# Patient Record
Sex: MALE | Race: WHITE | Employment: STUDENT | URBAN - METROPOLITAN AREA
[De-identification: names, ages, dates, MRNs, and addresses within clinical notes are randomized per-mention and may not be internally consistent; named-entity substitution may affect disease eponyms.]

---
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
## 2019-02-21 ENCOUNTER — OFFICE VISIT (OUTPATIENT)
Dept: FAMILY MEDICINE CLINIC | Age: 19
End: 2019-02-21
Payer: COMMERCIAL

## 2019-02-21 VITALS
WEIGHT: 167 LBS | HEART RATE: 77 BPM | DIASTOLIC BLOOD PRESSURE: 82 MMHG | OXYGEN SATURATION: 98 % | SYSTOLIC BLOOD PRESSURE: 104 MMHG | TEMPERATURE: 97.7 F | BODY MASS INDEX: 22.65 KG/M2

## 2019-02-21 DIAGNOSIS — J11.1 INFLUENZA: Primary | ICD-10-CM

## 2019-02-21 PROCEDURE — 99213 OFFICE O/P EST LOW 20 MIN: CPT | Performed by: NURSE PRACTITIONER

## 2019-02-21 ASSESSMENT — PATIENT HEALTH QUESTIONNAIRE - PHQ9
2. FEELING DOWN, DEPRESSED OR HOPELESS: 0
SUM OF ALL RESPONSES TO PHQ QUESTIONS 1-9: 0
SUM OF ALL RESPONSES TO PHQ QUESTIONS 1-9: 0
SUM OF ALL RESPONSES TO PHQ9 QUESTIONS 1 & 2: 0
1. LITTLE INTEREST OR PLEASURE IN DOING THINGS: 0

## 2019-04-14 ENCOUNTER — HOSPITAL ENCOUNTER (EMERGENCY)
Age: 19
Discharge: HOME | End: 2019-04-14
Payer: COMMERCIAL

## 2019-04-14 ENCOUNTER — APPOINTMENT (OUTPATIENT)
Dept: GENERAL RADIOLOGY | Age: 19
End: 2019-04-14
Payer: COMMERCIAL

## 2019-04-14 VITALS
DIASTOLIC BLOOD PRESSURE: 78 MMHG | BODY MASS INDEX: 22.43 KG/M2 | RESPIRATION RATE: 16 BRPM | SYSTOLIC BLOOD PRESSURE: 136 MMHG | WEIGHT: 170 LBS | OXYGEN SATURATION: 99 % | HEART RATE: 78 BPM | TEMPERATURE: 98.3 F

## 2019-04-14 VITALS
SYSTOLIC BLOOD PRESSURE: 136 MMHG | RESPIRATION RATE: 18 BRPM | OXYGEN SATURATION: 98 % | HEIGHT: 73 IN | DIASTOLIC BLOOD PRESSURE: 75 MMHG | HEART RATE: 88 BPM | TEMPERATURE: 98 F | BODY MASS INDEX: 22.53 KG/M2 | WEIGHT: 170 LBS

## 2019-04-14 DIAGNOSIS — M25.562 LEFT KNEE PAIN, UNSPECIFIED CHRONICITY: Primary | ICD-10-CM

## 2019-04-14 DIAGNOSIS — M25.562 ACUTE PAIN OF LEFT KNEE: Primary | ICD-10-CM

## 2019-04-14 PROCEDURE — 6370000000 HC RX 637 (ALT 250 FOR IP): Performed by: PHYSICIAN ASSISTANT

## 2019-04-14 PROCEDURE — 99283 EMERGENCY DEPT VISIT LOW MDM: CPT

## 2019-04-14 PROCEDURE — 73560 X-RAY EXAM OF KNEE 1 OR 2: CPT

## 2019-04-14 RX ORDER — IBUPROFEN 800 MG/1
800 TABLET ORAL EVERY 6 HOURS PRN
Qty: 30 TABLET | Refills: 0 | Status: SHIPPED | OUTPATIENT
Start: 2019-04-14

## 2019-04-14 RX ORDER — HYDROCODONE BITARTRATE AND ACETAMINOPHEN 5; 325 MG/1; MG/1
1 TABLET ORAL EVERY 4 HOURS PRN
Qty: 12 TABLET | Refills: 0 | Status: SHIPPED | OUTPATIENT
Start: 2019-04-14 | End: 2019-04-17

## 2019-04-14 RX ORDER — DIAZEPAM 5 MG/1
5 TABLET ORAL ONCE
Status: COMPLETED | OUTPATIENT
Start: 2019-04-14 | End: 2019-04-14

## 2019-04-14 RX ORDER — HYDROCODONE BITARTRATE AND ACETAMINOPHEN 5; 325 MG/1; MG/1
1 TABLET ORAL ONCE
Status: COMPLETED | OUTPATIENT
Start: 2019-04-14 | End: 2019-04-14

## 2019-04-14 RX ADMIN — HYDROCODONE BITARTRATE AND ACETAMINOPHEN 1 TABLET: 5; 325 TABLET ORAL at 13:11

## 2019-04-14 RX ADMIN — DIAZEPAM 5 MG: 5 TABLET ORAL at 21:20

## 2019-04-14 ASSESSMENT — PAIN DESCRIPTION - PAIN TYPE: TYPE: ACUTE PAIN

## 2019-04-14 ASSESSMENT — PAIN SCALES - GENERAL
PAINLEVEL_OUTOF10: 7
PAINLEVEL_OUTOF10: 8
PAINLEVEL_OUTOF10: 8

## 2019-04-14 ASSESSMENT — PAIN DESCRIPTION - LOCATION: LOCATION: KNEE

## 2019-04-14 ASSESSMENT — PAIN DESCRIPTION - DESCRIPTORS: DESCRIPTORS: CONSTANT

## 2019-04-14 ASSESSMENT — PAIN DESCRIPTION - ORIENTATION: ORIENTATION: LEFT

## 2019-04-14 NOTE — LETTER
[facility] Emergency Department  [address] [address]  Phone: [phone]  Fax: [phone]             April 14, 2019    Patient: Laura March   YOB: 2000   Date of Visit: 4/14/2019       To Whom It May Concern:    Luara March was seen and treated in our emergency department on 4/14/2019.  He needs to have access to a wheelchair for use until seen and cleared by an Ortho Specialist.       Sincerely,     Nurse,        Signature:__________________________________

## 2019-04-14 NOTE — ED TRIAGE NOTES
Pt states he was playing soccer, when his L knee just gave out on him. Pt states he is in a lot of pain, pt states he cant apply any pressure to his leg .

## 2019-04-14 NOTE — ED PROVIDER NOTES
eMERGENCY dEPARTMENT eNCOUnter      PCP: Laly Cuba, APRN - CNP    CHIEF COMPLAINT    Chief Complaint   Patient presents with    Knee Pain     left knee; patient states that he injured it during soccer practice       HPI    Leslie Valdez is a 22 y.o. male who presents with Left knee pain. Onset was one week ago, then again today. The context is Pt plays soccer for [employer] and twisted knee in a valgus direction. Pain is localized to the medial knee. The pain severity is moderate-severe. The patient has no associated other injuries. The pain is aggravated by ambulation and knee movement. Georgian Justice REVIEW OF SYSTEMS    General: Denies fever or chills  Cardiac: Denies chest pain  Pulmonary: Denies shortness of breath  GI: Denies abdominal pain, vomiting, or diarrhea  : No dysuria or hematuria    Denies any other muscles skeletal injuries, including chest wall and back.     All other review of systems are negative  See HPI and nursing notes for additional information     PAST MEDICAL & SURGICAL HISTORY    Past Medical History:   Diagnosis Date    APML (acute promyelocytic leukemia) ([facility] [address] 04/09/2013    APML (acute promyelocytic leukemia) ([facility] [address]      Past Surgical History:   Procedure Laterality Date    BONE MARROW BIOPSY      from his lumbar vertebrae    BONE MARROW BIOPSY      CLAVICLE SURGERY  10/2018       CURRENT MEDICATIONS    Current Outpatient Rx   Medication Sig Dispense Refill    Phenylephrine-DM-GG-APAP (VICKS DAYQUIL SEVERE COLD/FLU) 5--325 MG/15ML LIQD Take as directed every 4 hours during the day  0    Phenyleph-Doxylamine-DM-APAP (VICKS NYQUIL SEVERE COLD/FLU) 5-6.25- MG/15ML LIQD Take at night time as directed  0    ibuprofen (ADVIL;MOTRIN) 600 MG tablet Take 1 tablet by mouth every 6 hours as needed for Pain 28 tablet 0       ALLERGIES    Allergies   Allergen Reactions    Phenergan [Promethazine] Itching and Other (See Comments)     Redness    Phenergan [Promethazine Hcl] extension (Extensor mechanism intact)    Flexion - Mildly limited due pain   -Provocative tests:   Manual manipulation of knee limited due to pain. No swelling, discoloration, tenderness to palpation of lower leg, or range of motion deficit of the ipsilateral hip and ankle  Vascular: Distal pulses (DP, PT) intact on affected side. Capillary refill intact. Integument:  Well hydrated, no rash   Neurologic:  Awake and alert, normal flow of speech. Distal sensation, motor intact. Psychiatric: Cooperative, pleasant affect        RADIOLOGY/PROCEDURES    Xr Knee Left (1-2 Views)    Result Date: 4/14/2019  EXAMINATION: 2 XRAY VIEWS OF THE LEFT KNEE 4/14/2019 12:12 pm COMPARISON: None. HISTORY: ORDERING SYSTEM PROVIDED HISTORY: Injury TECHNOLOGIST PROVIDED HISTORY: Reason for exam:->Injury Ordering Physician Provided Reason for Exam: injury, patient not able to straighten knee Acuity: Acute Type of Exam: Initial Mechanism of Injury: male who presents with Left knee pain. Onset was one week ago, then again today. The context is Pt plays soccer for [employer] and twisted knee in a valgus direction. Pain is localized to the medial knee. The pain severity is moderate-severe FINDINGS: No evidence of acute fracture or dislocation. No focal osseous lesion. No evidence of joint effusion. No focal soft tissue abnormality. No acute abnormality of the knee. ED COURSE & MEDICAL DECISION MAKING        History and exam is consistent with knee sprain versus strain. .  I discussed the possibility of internal derangement. Recommend follow-up with orthopedist..  Diagnosis and plan discussed in detail with patient who understands and agrees. Patient agrees to return emergency department if symptoms worsen or any new symptoms develop. Vital signs and nursing notes reviewed during ED course. I have independently evaluated this patient .   Supervising MD present in the Emergency Department, available for consultation, throughout entirety of  patient care. Clinical  IMPRESSION    1. Acute pain of left knee            Comment: Please note this report has been produced using speech recognition software and may contain errors related to that system including errors in grammar, punctuation, and spelling, as well as words and phrases that may be inappropriate. If there are any questions or concerns please feel free to contact the dictating provider for clarification.        Guadalupe Jaimes [place]  04/14/19 0148

## 2019-04-14 NOTE — ED NOTES
Discharge instructions reviewed with patient. PT verbalizes understanding. All questions answered. Follow up instructions given. PT denies any further needs at this time.       Caitlyn Escudero, [place]  44/36/52 [id number]

## 2019-04-15 NOTE — ED PROVIDER NOTES
eMERGENCY dEPARTMENT eNCOUnter        [facility]    Chief Complaint   Patient presents with    Knee Pain       HPI    Gerald Dailey is a 22 y.o. male who presents with left knee pain. Onset was initially about a week ago, new injury again this morning. Patient reports being a [employer]  and twisting the knee while playing. He was seen here this morning and had a negative XR--was advised that he may have a ligamentous tear and needs FU with Orthopedics. Patient was prescribed Norco.  He refused knee immobilizer at the time. He states he is back because the knee still hurts. Pain has been mostly constant. Localized to the medial aspect of the knee. He has taken the Norco with intermittent relief. He had a muscle spasm just prior to arrival which triggered call to EMS. Severity of the pain is 7/10. He denies any new injury since being seen this morning. REVIEW OF SYSTEMS    Constitutional:  Denies fever. Respiratory:  Denies any shortness of breath. Cardiovascular:  Denies chest pain. Musculoskeletal:  + left knee pain. Integument:  Denies erythema, denies abrasions/lacerations. Neurologic:  Denies numbness or tingling. PAST MEDICAL & SURGICAL HISTORY    Past Medical History:   Diagnosis Date    APML (acute promyelocytic leukemia) ([facility] [address] 04/09/2013    APML (acute promyelocytic leukemia) ([facility] [address]      Past Surgical History:   Procedure Laterality Date    BONE MARROW BIOPSY      from his lumbar vertebrae    BONE MARROW BIOPSY      CLAVICLE SURGERY  10/2018       CURRENT MEDICATIONS    Current Outpatient Rx   Medication Sig Dispense Refill    ibuprofen (ADVIL;MOTRIN) 800 MG tablet Take 1 tablet by mouth every 6 hours as needed for Pain 30 tablet 0    HYDROcodone-acetaminophen (NORCO) 5-325 MG per tablet Take 1 tablet by mouth every 4 hours as needed for Pain for up to 3 days.  12 tablet 0    Phenylephrine-DM-GG-APAP (VICKS DAYQUIL SEVERE COLD/FLU) 5--325 MG/15ML

## 2019-04-26 ENCOUNTER — HOSPITAL ENCOUNTER (OUTPATIENT)
Dept: PHYSICAL THERAPY | Age: 19
Setting detail: THERAPIES SERIES
Discharge: HOME | End: 2019-04-26
Payer: COMMERCIAL

## 2019-04-26 PROCEDURE — 97161 PT EVAL LOW COMPLEX 20 MIN: CPT

## 2019-04-26 PROCEDURE — G0283 ELEC STIM OTHER THAN WOUND: HCPCS

## 2019-04-26 NOTE — PROGRESS NOTES
[facility]) at [facility]   [address] [address]  Fax [phone]     Malik Porter PT,ATC Phone: [phone]                                              To:  Dr Livier Edouard          From: Alan Mccord, PT ATC     Patient: Roula Verdugo                    : 2000  Diagnosis:  L ACL BPTB + Med Men Repair 19    Date: 2019   Initial PT Evaluation/POC                   Evaluation Date:  19        Total Visits to date: 1       Outcome Measure: IKDC                     Initial Score: 13.8%   Discharge Score:             Sport:  [employer] (also plays tennis here at [place])  Patient Goal: Be ready for soccer season in 2020, return to soccer fall    Patient History/Mechanism of Injury: Pt states he was at practice 19 and left knee buckled. He saw Dr Angela Medrano and was told to stop playing soccer for a week, then return to see Dr. Solomon Simple states he doesn't remember the doctor saying that. Pt returned to soccer and tore his ACL on 19 during a captains practice. Surgery 19.     Symptoms:     location   description   duration   rate 0-10    Inf patella sharp constant 2-4/10   Aggrevators:  WB             Relievers: Ice+rest    Objective/Significant Findings + Goals     19 Goals TBA in 6wks   Outcome IKDC 13.8% >30% in 2wks   Brace Locked for 4 wks, at wk two drop lock to sit Drop lock before he leaves for summer break   Pain Constant 2-4/10 Int <3/10   Sleeping Sleeping well  MET   Gait 2 crutches locked brace Off crutches between weeks 4-6 per MD   Quad Tone Poor, no patellar glide >fair+ with sup patellar glide and TKE   PROM Flexion Very apprehensive 45deg 90 deg by week 4   PROM Ext 2-3 deg flexed 2-3deg hyper   Edema (knee circumference) NT NA   Quad Girth (4in sup to patella) L=46cm   R=46cm MET   SLR unable x30 with no lag     Goal Status: [] Achieved [] Partially Achieved  [x] Not Achieved, established today     Assessment:  Rehab physician

## 2019-04-26 NOTE — FLOWSHEET NOTE
[facility]) Physical Therapy at [address], [facility], [address]   Phone: [phone]   Fax:[phone]         Physical Therapy Treatment Note  Date:  2019    Patient Name:  Leslie Valdez      :  2000 MRN: [id number]    Physician: Lottie Cowden  Next Doctor Visit:  ? Insurance/Certification information: Does not have BMI  Plan of care signed (Y/N): Pending  Changes to ambulatory summary sheet? No    Visit# / total visits:  1      Summary of history from Evaluation:  Sport:  [employer] (also plays tennis here at [place])  Patient Goal: Be ready for soccer season in 2020, return to soccer fall    Patient History/Mechanism of Injury: Pt states he was at practice 19 and left knee buckled. He saw Dr Danilo Slater and was told to stop playing soccer for a week, then return to see Dr. Davis Terrell states he doesn't remember the doctor saying that. Pt returned to soccer and tore his ACL on 19 during a captains practice. Surgery 19.     Subjective/Pain:  Constant 2-4/10 inf patella    Objective: See [place], poor quad tone with min sup patellar glide    Exercises:  L ACL BPTB + Med Min Repair 19 Visit# Visit#      Visit#1     ES 10min     ROM          Ext 0     Flexion stretch with 12in bolster under knee   10 min     Flexion Flex=60 apprehensive, used bolster     Gait training TTWB locked brace 2 crutches     Exercises      SLR x30     Side Hip Abd x30     Heel slides with towel x10                       Manual Treatment  Patellar mobs     Ice 10min       Home Exercises Given: QS Flexion    Assessment:  Apprehensive and tight with flexion    Phase I - Immediate post-operative phase     Goals:   Educate the patient on precautions to protect the repair    Decrease pain and inflammation    Stimulate quad function     Attain ROM - especially patellar mobility and full extension by 2 weeks    Teach proper gait with crutches and brace    Improve neuromuscular control     Guidelines:   With posterior horn or unstable repair, NO hamstring work  for 6 weeks - no active knee flexion, heelslide or hip extension   Partial weight bearing for  4 weeks up to 50% with brace locked - 2 crutches   Criteria for FWB:  little to no effusion, good SLR with no quad lag & absent pain at repair site (MD will release patient for full WB between 4-6 weeks post-op typically)   If medial repair, avoid adduction SLR and if lateral repair avoid abduction SLR for 1st 1-2 weeks   PROM flexion not beyond 90° for 4 weeks - if not posterior horn may flex to 105°    Phase I:  Maximal Protection (weeks 1-4: days 1-28)    Brace locked at [facility]. At week 2, may drop lock brace for sitting only up to 90°- DO NOT ADJUST WITHOUT MD APPROVAL. PWB with 2 crutches. Goals:   PROM 0-90°, good patellar mobility   Quad control sufficient for SLR independently with no lag   Decrease pain and inflammation   Educate Patient on precautions to protect the repair      EXERCISES   Patellar mobilizations   Calf stretch with towel/strap   Seated hamstring stretch with towel roll under ankle to promote full extension   Focus on obtaining full extension. Overpressure/low load into full passive extension - calf prop or seated low load.  Ankle pumps   Quad sets - may use functional e stim to facilitate quad contraction   SLR (add 1# at a time after 1 week if no quad lag) - may use estim for quad contraction   SLR abduction or adduction, no extension if posterior horn or unstable repair   Multi angle quad sets (30, 60, 90°)   Seated PROM with opposite leg 0-90°   Week 2 AROM extension 90-30°, may add 1# weight at a time.    Week 3 initiate balance/proprioception with upper extremity support & bilateral lower extremities on the ground   Bilateral WB exercises: mini squats, heel raises, terminal knee extension with crutches and band above knee joint line    Treatment/Activity Tolerance:  [x] Patient tolerated treatment well [] Patient limited by fatigue  [x] Patient limited by pain  [] Patient limited by other medical complications  [] Other:     Time In/Time Out: 3803-6438                      Timed Code/Total Treatment Minutes: 1Eval Low   1ES    Plan: Cont per above protocol    Electronically signed by:

## 2019-05-03 ENCOUNTER — HOSPITAL ENCOUNTER (OUTPATIENT)
Dept: PHYSICAL THERAPY | Age: 19
Setting detail: THERAPIES SERIES
Discharge: HOME | End: 2019-05-03
Payer: COMMERCIAL

## 2019-05-03 PROCEDURE — G0283 ELEC STIM OTHER THAN WOUND: HCPCS

## 2019-05-03 PROCEDURE — 97110 THERAPEUTIC EXERCISES: CPT

## 2019-05-03 PROCEDURE — 97140 MANUAL THERAPY 1/> REGIONS: CPT

## 2019-05-03 NOTE — FLOWSHEET NOTE
[facility]) Physical Therapy at [address], [facility], [address]  Phone: [phone]   Fax:[phone]         Physical Therapy Treatment Note  Date:  5/3/2019    Patient Name:  Ja Buchanan      :  2000 MRN: [id number]    Physician: Remy Moyer Doctor Visit:  ? Insurance/Certification information: Does not have BMI  Plan of care signed (Y/N): Pending  Changes to ambulatory summary sheet? No    Visit# / total visits: 2    Summary of history from Evaluation:  Sport:  [employer] (also plays tennis here at [place])  Patient Goal: Be ready for soccer season in 2020, return to soccer fall    Patient History/Mechanism of Injury: Pt states he was at practice 19 and left knee buckled. He saw Dr Chester Escobar and was told to stop playing soccer for a week, then return to see Dr. Radha Vincent states he doesn't remember the doctor saying that. Pt returned to soccer and tore his ACL on 19 during a captains practice. Surgery 19. Subjective/Pain:  Constant 2-4/10 inf patella    Objective: See [place], poor quad tone with min sup patellar glide    Exercises:  L ACL BPTB + Med Min Repair 4-22-19    4-26-19 4-30-19 5-3-19      Visit#1 CANCELLED Visit#2   ES 10min  10min   ROM          Ext 0  0   Flexion stretch with 12in bolster under knee   10 min  Hang off table 10 min   Flexion Flex=60 apprehensive, used bolster  90 after stretching   Gait training TTWB locked brace 2 crutches  Reviewed gait TTWB 2 crutches   Exercises      SLR x30  x30   Side Hip Abd x30  x30   Heel slides with towel x10  x10            Had \"tube\" of swelling around thigh, given tubigrip. To see  Monday         Manual Treatment  Patellar mobs  I with mobs   Ice 10min  10 min     Home Exercises Given: QS Flexion Showed patient how to drop lock.   Will wait until MD appt to do that  Assessment:  After stretching, flexion to 80 with open end feel    Phase I - Immediate post-operative phase     Goals:   Educate the patient on precautions to protect the repair    Decrease pain and inflammation    Stimulate quad function     Attain ROM - especially patellar mobility and full extension by 2 weeks    Teach proper gait with crutches and brace    Improve neuromuscular control     Guidelines:   With posterior horn or unstable repair, NO hamstring work  for 6 weeks - no active knee flexion, heelslide or hip extension   Partial weight bearing for  4 weeks up to 50% with brace locked - 2 crutches   Criteria for FWB:  little to no effusion, good SLR with no quad lag & absent pain at repair site (MD will release patient for full WB between 4-6 weeks post-op typically)   If medial repair, avoid adduction SLR and if lateral repair avoid abduction SLR for 1st 1-2 weeks   PROM flexion not beyond 90° for 4 weeks - if not posterior horn may flex to 105°    Phase I:  Maximal Protection (weeks 1-4: days 1-28)    Brace locked at [facility]. At week 2, may drop lock brace for sitting only up to 90°- DO NOT ADJUST WITHOUT MD APPROVAL. PWB with 2 crutches. Goals:   PROM 0-90°, good patellar mobility   Quad control sufficient for SLR independently with no lag   Decrease pain and inflammation   Educate Patient on precautions to protect the repair      EXERCISES   Patellar mobilizations   Calf stretch with towel/strap   Seated hamstring stretch with towel roll under ankle to promote full extension   Focus on obtaining full extension. Overpressure/low load into full passive extension - calf prop or seated low load.    Ankle pumps   Quad sets - may use functional e stim to facilitate quad contraction   SLR (add 1# at a time after 1 week if no quad lag) - may use estim for quad contraction   SLR abduction or adduction, no extension if posterior horn or unstable repair   Multi angle quad sets (30, 60, 90°)   Seated PROM with opposite leg 0-90°   Week 2 AROM extension 90-30°, may add 1# weight at a time.    Week 3 initiate balance/proprioception with upper extremity support & bilateral lower extremities on the ground   Bilateral WB exercises: mini squats, heel raises, terminal knee extension with crutches and band above knee joint line    Treatment/Activity Tolerance:  [x] Patient tolerated treatment well [] Patient limited by fatigue  [x] Patient limited by pain  [] Patient limited by other medical complications  [] Other:     Time In/Time Out: 5249-3039                     Timed Code/Total Treatment Minutes: 1ES 1TE    Plan: See DC summary    Electronically signed by:

## 2019-05-03 NOTE — PROGRESS NOTES
[facility]) at [facility]   [address] [address]  Fax [phone]     Jaguar Castellanos PT,ATC Phone: [phone]                                              To:  Dr Alejandra Gallardo          From: Braeden White, PT ATC     Patient: Sanna Mckenzie                    : 2000  Diagnosis:  L ACL BPTB + Med Men Repair 19    Date: 5/3/2019   PT DISCHARGE  Evaluation Date:  19        Total Visits to date: 2     Outcome Measure: IKDC                     Initial Score: 13.8%   Discharge Score: 13.8%            Sport:  [employer] (also plays tennis here at [place])  Patient Goal: Be ready for soccer season in 2020, return to soccer fall    Patient History/Mechanism of Injury: Pt states he was at practice 19 and left knee buckled. He saw Dr Ines Benavides and was told to stop playing soccer for a week, then return to see Dr. Little Chung states he doesn't remember the doctor saying that. Pt returned to soccer and tore his ACL on 19 during a captains practice. Surgery 19.     Symptoms:     location   description   duration   rate 0-10    Inf patella sharp constant 2-4/10   Aggrevators:  WB             Relievers: Ice+rest    Objective/Significant Findings + Goals     4-26-19 5-3-19 Goals TBA in 6wks   Outcome IKDC 13.8% 13.8 >30% in 2wks   Brace Locked for 4 wks, at wk two drop lock to sit Instructed in dropping lock for sitting, will wait for MD appt to get approval Drop lock before he leaves for summer break   Pain Constant 2-4/10 2-3 Int <3/10   Sleeping Sleeping well  MET MET   Gait 2 crutches locked brace 2 crutches TTWB, locked brace for gait Off crutches between weeks 4-6 per MD   Quad Tone Poor, no patellar glide Poor+ >fair+ with sup patellar glide and TKE   PROM Flexion Very apprehensive 45deg 80 after gentle stretching 90 deg by week 4   PROM Ext 2-3 deg flexed 0 2-3deg hyper   Edema (knee circumference) NT NT NA   Quad Girth (4in sup to patella) L=46cm R=46cm NT MET   SLR unable NT x30 with no lag     Goal Status: [] Achieved [] Partially Achieved  PATIENT ONLY ATTENDED 2 VISITS     Assessment:  Rehab Potential:   [x] Excellent [] Good [] Fair  [] Poor     Education on:  Graft protection    Plan: Pt going home for summer and has PT scheduled for May 13. Pt was given script and protocol to take to home PT    Electronically signed by:  Deb Champion PT, 5/3/2019, 3:43 PM    If you have any questions or concerns, please don't hesitate to call.  Thank you for your referral.    Physician Signature:__________________________________ Date:______ Time: ________  By signing above, therapists plan is approved by physician

## 2019-08-15 ENCOUNTER — HOSPITAL ENCOUNTER (OUTPATIENT)
Dept: PHYSICAL THERAPY | Age: 19
Setting detail: THERAPIES SERIES
Discharge: HOME | End: 2019-08-15
Payer: COMMERCIAL

## 2019-08-15 PROCEDURE — 97110 THERAPEUTIC EXERCISES: CPT

## 2019-08-15 PROCEDURE — 97164 PT RE-EVAL EST PLAN CARE: CPT

## 2019-08-15 NOTE — PROGRESS NOTES
[facility]) at [facility]   [address] [id number]  Fax [phone]     Juan Alberto Roberts PT,ATC Phone: [phone]                                              To:  Dr Chung Rogel          From: Tammi Montanez, PT ATC     Patient: Froylan Hernandez                    : 2000  Diagnosis:  L ACL BPTB + Med Men Repair 19    Date: 8/15/2019   PT Reassessment  Evaluation Date:  19        Total Visits to date: 2 + 20 at home this summer    Outcome Measure: IKDC                     Initial Score: 13.8%  Today's Score: 44%            Sport:  [employer] (also plays tennis here at [place])  Patient Goal: Be ready for soccer season in 2020, return to soccer fall . Wants to play tennis in 2020. Patient History/Mechanism of Injury: Pt states he was at practice 19 and left knee buckled. He saw Dr Maggie Harris and was told to stop playing soccer for a week, then return to see Dr. Jos eCarlos Thakkar states he doesn't remember the doctor saying that. Pt returned to soccer and tore his ACL on 19 during a captains practice. Surgery 19.  Had 2 visits of PT here at [place] then went home for summer and did PT in [place] approx 20 visits    Symptoms:     location   description   duration   rate 0-10    Ant knee With kneeling  intermittent 7/10   Ant knee Stiff after prolonged sitting intermitten 6/10   Aggrevators:  Prolonged sitting             Relievers: Ice+rest    Objective/Significant Findings + Goals     4-26-19 5-3-19 8-15-19 (16wks postop) deficit Goals TBA in 6wks   Outcome IKDC 13.8% 13.8 44%  >70%   Brace Locked for 4 wks, at wk two drop lock to sit Instructed in dropping lock for sitting, will wait for MD appt to get approval Brace discharged, pt was already fitted for [employer] brace and it will be shipped here to [facility] lock before he leaves for summer break   Pain Constant 2-4/10 2-3 Stiff 6/10, kneeling 7/10  Stiffness<3, kneeling 5/10   Sleeping Sleeping well

## 2019-08-15 NOTE — FLOWSHEET NOTE
[facility]) Physical Therapy at [address], [facility], [address]   Phone: [phone]   Fax:[phone]         Physical Therapy Treatment Note  Date:  8/15/2019    Patient Name:  Jeffery Manley      :  2000    MRN: [id number]  Diagnosis: L ACL BPTB + Med Men Repair 19           Physician:  Dr Chapis Walden  Next Doctor Visit:  October      Plan of care signed (Y/N): Pending  Changes to ambulatory summary sheet? No    Summary of history from Evaluation:  Sport:  [employer] (also plays tennis here at [place])  Patient Goal: Be ready for soccer season in 2020, return to soccer fall . Wants to play tennis in 2020. Patient History/Mechanism of Injury: Pt states he was at practice 19 and left knee buckled. He saw Dr Kj Shankar and was told to stop playing soccer for a week, then return to see Dr. Haritha Quintero states he doesn't remember the doctor saying that. Pt returned to soccer and tore his ACL on 19 during a captains practice. Surgery 19. Had 2 visits of PT here at [place] then went home for summer and did PT in [place] approx 20 visits    Subjective/Pain:    Stiff after prolonged sitting 6/10, Kneeling pain 7/10    Objective:   See Re-assessment in progress note today. Exercises:  L ACL+Men Repair 4-22-19 8-15-19      8/12=16wks postop      Visit#3      Re-assess today      Warm Up Elliptical  NT     PrePlyo      Shuttle air hops NT     HC                  Proprioception      SL black bosu tossups  X30, blue next rx     Excursions    NT           Ther Ex      Lateral Step Down 6in L=22 R=21     SL chair sit 9in L=13  R=24     RDL NT     Bosu Squat hold with TB NT     Calf Raises       Lateral Walking with TB                                  Fitness Center      SL Press L=10#x10 R=50#x10     SL Ham Curl  L=20#x10 R=30#x10                        Assessment:  See updated plan of care done today.   38% deficit, NOT ready to begin plyo or

## 2019-08-20 ENCOUNTER — HOSPITAL ENCOUNTER (OUTPATIENT)
Dept: PHYSICAL THERAPY | Age: 19
Setting detail: THERAPIES SERIES
Discharge: HOME | End: 2019-08-20
Payer: COMMERCIAL

## 2019-08-20 PROCEDURE — 97110 THERAPEUTIC EXERCISES: CPT

## 2019-08-20 PROCEDURE — 97112 NEUROMUSCULAR REEDUCATION: CPT

## 2019-08-20 PROCEDURE — 97530 THERAPEUTIC ACTIVITIES: CPT

## 2019-08-22 ENCOUNTER — HOSPITAL ENCOUNTER (OUTPATIENT)
Dept: PHYSICAL THERAPY | Age: 19
Setting detail: THERAPIES SERIES
Discharge: HOME | End: 2019-08-22
Payer: COMMERCIAL

## 2019-08-22 PROCEDURE — 97110 THERAPEUTIC EXERCISES: CPT

## 2019-08-22 PROCEDURE — 97530 THERAPEUTIC ACTIVITIES: CPT

## 2019-08-22 PROCEDURE — 97112 NEUROMUSCULAR REEDUCATION: CPT

## 2019-08-26 ENCOUNTER — HOSPITAL ENCOUNTER (OUTPATIENT)
Dept: PHYSICAL THERAPY | Age: 19
Setting detail: THERAPIES SERIES
Discharge: HOME | End: 2019-08-26
Payer: COMMERCIAL

## 2019-08-26 PROCEDURE — 97110 THERAPEUTIC EXERCISES: CPT

## 2019-08-26 PROCEDURE — 97112 NEUROMUSCULAR REEDUCATION: CPT

## 2019-08-26 PROCEDURE — 97530 THERAPEUTIC ACTIVITIES: CPT

## 2019-08-26 NOTE — FLOWSHEET NOTE
Press Backs    SLx20 1C   RDL NT SL 9#bar to 12in step x15 each side SL 9#bar to 12in step x15 thiago   12#x20+12in   Bosu Squat hold with TB NT With TB around knees [id number] 1w22sax with chair target    SL Shuttle Press   3C x30 3Cx30 NT   Calf Raises  SL x30 off step SL x30 SLx30   Lateral Walking with TB      x30 steps ea way x30 ea way x30ea way   Sidelying Hip Abduction  4#2x15     Halo   3x5 2x10   Hamstring curl ups   Red ball x30 Red ball [address]       SL Press L=10#x10 R=50#x10 L=10#2x10  R=50#2x10 L=20#2x10  R=50#2x10 L=10#2x10  R=30#2x10   SL Ham Curl  L=20#x10 R=30#x10 L=20#2x10  R=30#2x10 L=20#2x10      Assessment:  38% deficit, NOT ready to begin plyo or running. MIN/MOD cues to avoid valgus with SL sit. Jerrod Snyder improving each day    Treatment/Activity Tolerance:  [x] Patient tolerated treatment well [] Patient limited by fatigue  [] Patient limited by pain  [] Patient limited by other medical complications     Plan: Twice a week for 6 weeks then re-assess. Has poor eccentric quad control and valgus compensation. Will require significant verbal cues for correct tech with exercise.  Push Glut Med    Time In/Time Out:  9158-6513               Timed Code/Total Treatment Minutes:  2TE 1TA 1N    Electronically signed by:

## 2019-08-30 ENCOUNTER — HOSPITAL ENCOUNTER (OUTPATIENT)
Dept: PHYSICAL THERAPY | Age: 19
Setting detail: THERAPIES SERIES
Discharge: HOME | End: 2019-08-30
Payer: COMMERCIAL

## 2019-08-30 PROCEDURE — 97530 THERAPEUTIC ACTIVITIES: CPT

## 2019-08-30 PROCEDURE — 97112 NEUROMUSCULAR REEDUCATION: CPT

## 2019-08-30 PROCEDURE — 97110 THERAPEUTIC EXERCISES: CPT

## 2019-09-05 ENCOUNTER — HOSPITAL ENCOUNTER (OUTPATIENT)
Dept: PHYSICAL THERAPY | Age: 19
Setting detail: THERAPIES SERIES
Discharge: HOME | End: 2019-09-05
Payer: COMMERCIAL

## 2019-09-05 PROCEDURE — 97110 THERAPEUTIC EXERCISES: CPT

## 2019-09-05 PROCEDURE — 97530 THERAPEUTIC ACTIVITIES: CPT

## 2019-09-05 PROCEDURE — 97112 NEUROMUSCULAR REEDUCATION: CPT

## 2019-09-09 ENCOUNTER — HOSPITAL ENCOUNTER (OUTPATIENT)
Dept: PHYSICAL THERAPY | Age: 19
Setting detail: THERAPIES SERIES
Discharge: HOME | End: 2019-09-09
Payer: COMMERCIAL

## 2019-09-09 PROCEDURE — 97110 THERAPEUTIC EXERCISES: CPT

## 2019-09-09 PROCEDURE — 97112 NEUROMUSCULAR REEDUCATION: CPT

## 2019-09-09 PROCEDURE — 97530 THERAPEUTIC ACTIVITIES: CPT

## 2019-09-09 NOTE — FLOWSHEET NOTE
[facility]) Physical Therapy at [address] [place], [address]   Phone: [phone]   Fax:[phone]         Physical Therapy Treatment Note  Date:  2019    Patient Name:  Brigette Dozier      :  2000    MRN: [id number]  Diagnosis: L ACL BPTB + Med Men Repair 19           Physician:  Dr Gely Latif  Next Doctor Visit:  October      Plan of care signed (Y/N): Pending  Changes to ambulatory summary sheet? No    Summary of history from Evaluation:  Sport:  [employer] (also plays tennis here at [place])  Patient Goal: Be ready for soccer season in 2020, return to soccer fall . Wants to play tennis in 2020. Patient History/Mechanism of Injury: Pt states he was at practice 19 and left knee buckled. He saw Dr Myles Collins and was told to stop playing soccer for a week, then return to see Dr. Abiel Lujan states he doesn't remember the doctor saying that. Pt returned to soccer and tore his ACL on 19 during a captains practice. Surgery 19. Had 2 visits of PT here at [place] then went home for summer and did PT in [place] approx 20 visits    Subjective/Pain:  No pain throughout the day. Objective: Started mini hops today. Pt picks up L leg and hops off right only, max cues to avoid this. SL hops with cues to land soft and flex spine    Exercises:  L ACL+Men Repair 19=16wks   19 weeks =20wks 20wks    Vist#5 Visit#6 Visit 7 Visit#8 Visit#9           Warm Up Elliptical  .5mi . 5mile . 5 mi .5mi 5:50 .5mile   PrePlyo        Shuttle air hops 0Cx30 0Cx30 1C x 30 1Cx30 NT   HC 30in HC's 30in HCx30 30 in x 30 30in x30 8in  Step x20   Tramp  DL pre-plyox30 DL pre-plyo x 30 Pre-plyo x30 NT   Agility    [id number]   Split Lunge with hop    L fwd x30 +6in thiago x30+3in   Horizontal hops    Mini DLx10  Mini SLx10 DL to 36in  SL to 24in x10   Jog     [id number] today.   Cues to avoid eccentric drop

## 2019-09-12 ENCOUNTER — HOSPITAL ENCOUNTER (OUTPATIENT)
Dept: PHYSICAL THERAPY | Age: 19
Setting detail: THERAPIES SERIES
Discharge: HOME | End: 2019-09-12
Payer: COMMERCIAL

## 2019-09-12 PROCEDURE — 97110 THERAPEUTIC EXERCISES: CPT

## 2019-09-12 PROCEDURE — 97530 THERAPEUTIC ACTIVITIES: CPT

## 2019-09-12 PROCEDURE — 97112 NEUROMUSCULAR REEDUCATION: CPT

## 2019-09-16 ENCOUNTER — HOSPITAL ENCOUNTER (OUTPATIENT)
Dept: PHYSICAL THERAPY | Age: 19
Setting detail: THERAPIES SERIES
Discharge: HOME | End: 2019-09-16
Payer: COMMERCIAL

## 2019-09-16 PROCEDURE — 97530 THERAPEUTIC ACTIVITIES: CPT

## 2019-09-16 PROCEDURE — 97164 PT RE-EVAL EST PLAN CARE: CPT

## 2019-09-19 ENCOUNTER — HOSPITAL ENCOUNTER (OUTPATIENT)
Dept: PHYSICAL THERAPY | Age: 19
Setting detail: THERAPIES SERIES
Discharge: HOME | End: 2019-09-19
Payer: COMMERCIAL

## 2019-09-19 PROCEDURE — 97110 THERAPEUTIC EXERCISES: CPT

## 2019-09-19 PROCEDURE — 97112 NEUROMUSCULAR REEDUCATION: CPT

## 2019-09-19 PROCEDURE — 97530 THERAPEUTIC ACTIVITIES: CPT

## 2019-09-19 NOTE — FLOWSHEET NOTE
[facility]) Physical Therapy at [address] [place], [address]   Phone: [phone]   Fax:[phone]         Physical Therapy Treatment Note  Date:  2019    Patient Name:  Little Dillard      :  2000    MRN: [id number]  Diagnosis: L ACL BPTB + Med Men Repair 19           Physician:  Dr Ama Willett  Next Doctor Visit:  October      Plan of care signed (Y/N): Pending  Changes to ambulatory summary sheet? No    Summary of history from Evaluation:  Sport:  [employer] (also plays tennis here at [place])  Patient Goal: Be ready for soccer season in 2020, return to soccer fall . Wants to play tennis in 2020. Patient History/Mechanism of Injury: Pt states he was at practice 19 and left knee buckled. He saw Dr Jarrod Kwon and was told to stop playing soccer for a week, then return to see Dr. Flower Calderon states he doesn't remember the doctor saying that. Pt returned to soccer and tore his ACL on 19 during a captains practice. Surgery 19. Had 2 visits of PT here at [place] then went home for summer and did PT in [place] approx 20 visits    Subjective/Pain:  No pain throughout the day. Objective: Improved SL hops, cues to avoid taking trunk lateral to stance leg. Cues to normalize marquis on TM    Exercises:  L ACL+Men Repair 19=21wks =22wks    Visit#10 Visit#11        Warm Up Elliptical  .5mile . 5mi   Plyo     Gym:  accelerationsx3     Bounding x2     deccelerate x3     Lateral shuffle [id number]   Box drill   Next rx   JR 2x15 2x25   bwkd rot lunge +ellie Floor x20 +catch x15   bkwd lunge off bosu  x15   Sliding board  Next rx   Split squat switch in air thiago x30+3in Next rx   Horizontal hops double  SLx10   Jog   Walk jog .1/.1  2sets  HEP=.1/.1 walk jog at 5. 5mph 3sets, advance to 4 sets    Proprioception     SL black bosu tossups  Blue tossups L=11  R=10 30bilateral   Excursions        Ther Ex     Lateral lunge to bosu NT NT   Shuttle Press Backs 2Cx10 2C2x10   RDL Walking 9#bar x20 Walking 12#bar x15 thiago with assist, then DL at mirror x15   Bosu Squat hold with TB NT NT   Calf Raises HEP SLx30   Halo 2x15 [id number]   Fitness Center     SL Calf Raises   SL+db#20   4way Hip Abduction  55#x15bil   SL Press 30#2x10 L=30# R=50#   SL Ham Curl  30#2x10 L=40#  R=50#   Assessment:  Pt was very fatigued after PT today due to extra running. Treatment/Activity Tolerance:  [x] Patient tolerated treatment well [x] Patient limited by fatigue  [] Patient limited by pain  [] Patient limited by other medical complications     Plan: Cont PT 2x/wk. Note signed by MD to mario yoder. Pt will schedule his MD visit before next visit.     Time In/Time Out:  2769-4546            Timed Code/Total Treatment Minutes:  2TE, 1TA 1N    Electronically signed by:

## 2019-09-23 ENCOUNTER — HOSPITAL ENCOUNTER (OUTPATIENT)
Dept: PHYSICAL THERAPY | Age: 19
Setting detail: THERAPIES SERIES
Discharge: HOME | End: 2019-09-23
Payer: COMMERCIAL

## 2019-09-23 PROCEDURE — 97112 NEUROMUSCULAR REEDUCATION: CPT

## 2019-09-23 PROCEDURE — 97530 THERAPEUTIC ACTIVITIES: CPT

## 2019-09-23 PROCEDURE — 97110 THERAPEUTIC EXERCISES: CPT

## 2019-09-23 NOTE — FLOWSHEET NOTE
[facility]) Physical Therapy at [address], [address], [address]  Phone: [phone]   Fax:[phone]         Physical Therapy Treatment Note  Date:  2019    Patient Name:  Yelitza Murrieta      :  2000    MRN: [id number]  Diagnosis: L ACL BPTB + Med Men Repair 19           Physician:  Dr Kathy Yu  Next Doctor Visit:  October      Plan of care signed (Y/N): Pending  Changes to ambulatory summary sheet? No    Summary of history from Evaluation:  Sport:  [employer] (also plays tennis here at [place])  Patient Goal: Be ready for soccer season in 2020, return to soccer fall . Wants to play tennis in 2020. Patient History/Mechanism of Injury: Pt states he was at practice 19 and left knee buckled. He saw Dr Eric Peralta and was told to stop playing soccer for a week, then return to see Dr. Monroe Heimlich states he doesn't remember the doctor saying that. Pt returned to soccer and tore his ACL on 19 during a captains practice. Surgery 19. Had 2 visits of PT here at [place] then went home for summer and did PT in [place] approx 20 visits    Subjective/Pain:  No pain. Objective: improved running gait  With less eccentric drop  Exercises:  L ACL+Men Repair 19=21wks =22wks =22wks    Visit#10 Visit#11 Visit#12         Warm Up Elliptical  .5mile . 5mi . 5mi   Plyo      Gym:  accelerationsx3 accelerationsx3     Bounding x2 Bounding x2     deccelerate x3 Dec x3     Lateral shuffle [id number] Lateral shuffle 1v54rqm    Box drill   Next rx x3   PIvot  50% 50%x3   20yd hop   1/2 court   JR 2x15 2x25 3x25   bwkd rot lunge +hop Floor x20 +catch x15 +catch x15   bkwd lunge off bosu  x15 x15   Sliding board  Next rx 4k85kvd 30 reps   Split squat switch in air thiago x30+3in Next rx x15bil   Horizontal hops double  SLx10 SLx10   Jog   Walk jog .1/.1  2sets  HEP=.1/.1 walk jog at 5. 5mph 3sets, advance to 4 sets  Run a lap walk at

## 2019-09-27 ENCOUNTER — HOSPITAL ENCOUNTER (OUTPATIENT)
Dept: PHYSICAL THERAPY | Age: 19
Setting detail: THERAPIES SERIES
Discharge: HOME | End: 2019-09-27
Payer: COMMERCIAL

## 2019-09-27 PROCEDURE — 97110 THERAPEUTIC EXERCISES: CPT

## 2019-09-27 PROCEDURE — 97530 THERAPEUTIC ACTIVITIES: CPT

## 2019-09-27 NOTE — FLOWSHEET NOTE
double  SLx10 SLx10 NT   Jog   Walk jog .1/.1  2sets  HEP=.1/.1 walk jog at 5. 5mph 3sets, advance to 4 sets  Run a lap walk at lap NT   Proprioception       SL black bosu tossups  Blue tossups L=11  R=10 30bilateral 30 30 thiago   Excursions      NT X 15 thiago on black bosu   Ther Ex       Lateral lunge to bosu NT NT NT X 15 thiago   Shuttle Press Backs 2Cx10 2C2x10 2Cx15 2C x 15   RDL Walking 9#bar x20 Walking 12#bar x15 thiago with assist, then DL at mirror x15 [id number] X 15 thiago 12# walking    Bosu Squat hold with TB NT NT NT 2 x 60 seconds with cues to keep band taut   Halo 2x15 [id number] x15bil  x 15 [address]       SL Calf Raises   SL+db#20 20#db  HEP   4way Hip Abduction  55#x15bil HEP HEP   SL Press 30#2x10 L=30# R=50# HEP HEP   SL Ham Curl  30#2x10 L=40#  R=50# HEP HEP   Assessment:  Pt demonstrates difficulty and unsteadiness with SL proprioceptive activities    Treatment/Activity Tolerance:  [x] Patient tolerated treatment well [x] Patient limited by fatigue  [] Patient limited by pain  [] Patient limited by other medical complications     Plan: Cont PT 2x/wk. Note signed by MD to okay walk jog. Pt will schedule his MD visit before next visit.     Time In/Time Out:  1812-0479         Timed Code/Total Treatment Minutes:  1 TA, 1 TE    Electronically signed by:    Nita Hewitt, PTA [id number]

## 2019-09-30 ENCOUNTER — HOSPITAL ENCOUNTER (OUTPATIENT)
Dept: PHYSICAL THERAPY | Age: 19
Setting detail: THERAPIES SERIES
Discharge: HOME | End: 2019-09-30
Payer: COMMERCIAL

## 2019-09-30 PROCEDURE — 97112 NEUROMUSCULAR REEDUCATION: CPT

## 2019-09-30 PROCEDURE — 97110 THERAPEUTIC EXERCISES: CPT

## 2019-09-30 PROCEDURE — 97530 THERAPEUTIC ACTIVITIES: CPT

## 2019-10-07 ENCOUNTER — HOSPITAL ENCOUNTER (OUTPATIENT)
Dept: PHYSICAL THERAPY | Age: 19
Setting detail: THERAPIES SERIES
Discharge: HOME | End: 2019-10-07
Payer: COMMERCIAL

## 2019-10-07 PROCEDURE — 97530 THERAPEUTIC ACTIVITIES: CPT

## 2019-10-07 PROCEDURE — 97110 THERAPEUTIC EXERCISES: CPT

## 2019-10-07 PROCEDURE — 97112 NEUROMUSCULAR REEDUCATION: CPT

## 2019-10-09 ENCOUNTER — HOSPITAL ENCOUNTER (OUTPATIENT)
Dept: PHYSICAL THERAPY | Age: 19
Setting detail: THERAPIES SERIES
Discharge: HOME | End: 2019-10-09
Payer: COMMERCIAL

## 2019-10-09 PROCEDURE — 97530 THERAPEUTIC ACTIVITIES: CPT

## 2019-11-19 ENCOUNTER — OFFICE VISIT (OUTPATIENT)
Dept: FAMILY MEDICINE CLINIC | Age: 19
End: 2019-11-19
Payer: COMMERCIAL

## 2019-11-19 VITALS
HEART RATE: 90 BPM | OXYGEN SATURATION: 98 % | SYSTOLIC BLOOD PRESSURE: 110 MMHG | DIASTOLIC BLOOD PRESSURE: 74 MMHG | WEIGHT: 166 LBS | BODY MASS INDEX: 21.9 KG/M2

## 2019-11-19 DIAGNOSIS — F90.2 ATTENTION DEFICIT HYPERACTIVITY DISORDER (ADHD), COMBINED TYPE: Primary | Chronic | ICD-10-CM

## 2019-11-19 PROCEDURE — 99213 OFFICE O/P EST LOW 20 MIN: CPT | Performed by: NURSE PRACTITIONER

## 2019-11-19 RX ORDER — METHYLPHENIDATE HYDROCHLORIDE 10 MG/1
10 TABLET ORAL DAILY
Qty: 30 TABLET | Refills: 0 | Status: SHIPPED | OUTPATIENT
Start: 2019-11-19 | End: 2019-12-12

## 2019-11-19 RX ORDER — METHYLPHENIDATE HYDROCHLORIDE 27 MG/1
27 TABLET ORAL EVERY MORNING
Qty: 30 TABLET | Refills: 0 | Status: SHIPPED | OUTPATIENT
Start: 2019-11-19 | End: 2019-12-12

## 2019-12-12 ENCOUNTER — OFFICE VISIT (OUTPATIENT)
Dept: FAMILY MEDICINE CLINIC | Age: 19
End: 2019-12-12
Payer: COMMERCIAL

## 2019-12-12 VITALS
SYSTOLIC BLOOD PRESSURE: 104 MMHG | WEIGHT: 167 LBS | DIASTOLIC BLOOD PRESSURE: 80 MMHG | HEART RATE: 84 BPM | OXYGEN SATURATION: 98 % | BODY MASS INDEX: 22.03 KG/M2

## 2019-12-12 DIAGNOSIS — F90.2 ATTENTION DEFICIT HYPERACTIVITY DISORDER (ADHD), COMBINED TYPE: Chronic | ICD-10-CM

## 2019-12-12 PROCEDURE — 99213 OFFICE O/P EST LOW 20 MIN: CPT | Performed by: NURSE PRACTITIONER

## 2019-12-12 RX ORDER — METHYLPHENIDATE HYDROCHLORIDE 20 MG/1
20 TABLET ORAL DAILY
Qty: 30 TABLET | Refills: 0 | Status: SHIPPED | OUTPATIENT
Start: 2019-12-12 | End: 2020-01-11

## 2019-12-12 RX ORDER — METHYLPHENIDATE HYDROCHLORIDE 36 MG/1
36 TABLET ORAL EVERY MORNING
Qty: 30 TABLET | Refills: 0 | Status: SHIPPED | OUTPATIENT
Start: 2019-12-12 | End: 2020-01-18

## 2020-01-18 ENCOUNTER — APPOINTMENT (OUTPATIENT)
Dept: GENERAL RADIOLOGY | Age: 20
End: 2020-01-18
Payer: COMMERCIAL

## 2020-01-18 ENCOUNTER — HOSPITAL ENCOUNTER (EMERGENCY)
Age: 20
Discharge: LEFT BEFORE DISCHARGE | End: 2020-01-18
Payer: COMMERCIAL

## 2020-01-18 VITALS
DIASTOLIC BLOOD PRESSURE: 78 MMHG | RESPIRATION RATE: 18 BRPM | HEIGHT: 73 IN | OXYGEN SATURATION: 100 % | HEART RATE: 67 BPM | SYSTOLIC BLOOD PRESSURE: 128 MMHG | BODY MASS INDEX: 21.2 KG/M2 | WEIGHT: 160 LBS | TEMPERATURE: 98.3 F

## 2020-01-18 LAB
ALBUMIN SERPL-MCNC: 4.4 GM/DL (ref 3.4–5)
ALP BLD-CCNC: 79 IU/L (ref 40–128)
ALT SERPL-CCNC: 22 U/L (ref 10–40)
ANION GAP SERPL CALCULATED.3IONS-SCNC: 9 MMOL/L (ref 4–16)
AST SERPL-CCNC: 50 IU/L (ref 15–37)
BASOPHILS ABSOLUTE: 0 K/CU MM
BASOPHILS RELATIVE PERCENT: 0.4 % (ref 0–1)
BILIRUB SERPL-MCNC: 0.9 MG/DL (ref 0–1)
BUN BLDV-MCNC: 15 MG/DL (ref 6–23)
CALCIUM SERPL-MCNC: 8.8 MG/DL (ref 8.3–10.6)
CHLORIDE BLD-SCNC: 103 MMOL/L (ref 99–110)
CO2: 26 MMOL/L (ref 21–32)
CREAT SERPL-MCNC: 1 MG/DL (ref 0.9–1.3)
DIFFERENTIAL TYPE: ABNORMAL
EOSINOPHILS ABSOLUTE: 0.2 K/CU MM
EOSINOPHILS RELATIVE PERCENT: 1.9 % (ref 0–3)
GFR AFRICAN AMERICAN: >60 ML/MIN/1.73M2
GFR NON-AFRICAN AMERICAN: >60 ML/MIN/1.73M2
GLUCOSE BLD-MCNC: 97 MG/DL (ref 70–99)
HCT VFR BLD CALC: 37.8 % (ref 42–52)
HEMOGLOBIN: 12.9 GM/DL (ref 13.5–18)
IMMATURE NEUTROPHIL %: 0.1 % (ref 0–0.43)
LYMPHOCYTES ABSOLUTE: 3.4 K/CU MM
LYMPHOCYTES RELATIVE PERCENT: 41.8 % (ref 24–44)
MCH RBC QN AUTO: 31.5 PG (ref 27–31)
MCHC RBC AUTO-ENTMCNC: 34.1 % (ref 32–36)
MCV RBC AUTO: 92.4 FL (ref 78–100)
MONOCYTES ABSOLUTE: 0.6 K/CU MM
MONOCYTES RELATIVE PERCENT: 7.6 % (ref 0–4)
NUCLEATED RBC %: 0 %
PDW BLD-RTO: 11.9 % (ref 11.7–14.9)
PLATELET # BLD: 202 K/CU MM (ref 140–440)
PMV BLD AUTO: 8.9 FL (ref 7.5–11.1)
POTASSIUM SERPL-SCNC: 3.8 MMOL/L (ref 3.5–5.1)
RBC # BLD: 4.09 M/CU MM (ref 4.6–6.2)
SEGMENTED NEUTROPHILS ABSOLUTE COUNT: 3.9 K/CU MM
SEGMENTED NEUTROPHILS RELATIVE PERCENT: 48.2 % (ref 36–66)
SODIUM BLD-SCNC: 138 MMOL/L (ref 135–145)
TOTAL IMMATURE NEUTOROPHIL: 0.01 K/CU MM
TOTAL NUCLEATED RBC: 0 K/CU MM
TOTAL PROTEIN: 6.8 GM/DL (ref 6.4–8.2)
TROPONIN T: <0.01 NG/ML
TSH HIGH SENSITIVITY: 3.88 UIU/ML (ref 0.27–4.2)
WBC # BLD: 8 K/CU MM (ref 4–10.5)

## 2020-01-18 PROCEDURE — 99285 EMERGENCY DEPT VISIT HI MDM: CPT

## 2020-01-18 PROCEDURE — 93005 ELECTROCARDIOGRAM TRACING: CPT | Performed by: PHYSICIAN ASSISTANT

## 2020-01-18 PROCEDURE — 93010 ELECTROCARDIOGRAM REPORT: CPT | Performed by: INTERNAL MEDICINE

## 2020-01-18 PROCEDURE — 36415 COLL VENOUS BLD VENIPUNCTURE: CPT

## 2020-01-18 PROCEDURE — 84443 ASSAY THYROID STIM HORMONE: CPT

## 2020-01-18 PROCEDURE — 80053 COMPREHEN METABOLIC PANEL: CPT

## 2020-01-18 PROCEDURE — 85025 COMPLETE CBC W/AUTO DIFF WBC: CPT

## 2020-01-18 PROCEDURE — 71046 X-RAY EXAM CHEST 2 VIEWS: CPT

## 2020-01-18 PROCEDURE — 84484 ASSAY OF TROPONIN QUANT: CPT

## 2020-01-18 NOTE — ED PROVIDER NOTES
As provider-in-triage, I performed a medical screening history and physical exam on this patient. HISTORY OF PRESENT ILLNESS  Avelino Mary is a 20 y.o. 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  He notes that his Concerta dose was increased recently, started taking increased dose approximately a week ago. He also notes that he has not had any follow-up for his leukemia for several years, states that he was cleared of cancer and has not had any chemotherapy for approximately 5 to 6 years. .      PHYSICAL EXAM  /80   Pulse 88   Temp 98.3 °F (36.8 °C) (Oral)   Resp 18   Ht 6' 1\" (1.854 m)   Wt 160 lb (72.6 kg)   SpO2 100%   BMI 21.11 kg/m²     On exam, the patient appears well-hydrated, well-nourished, and in no acute distress. Mucous membranes are moist. Speech is clear. Breathing is unlabored. Skin is dry. Mental status is normal. Moves all extremities, and is without facial droop. Comment: Please note this report has been produced using speech recognition software and may contain errors related to that system including errors in grammar, punctuation, and spelling, as well as words and phrases that may be inappropriate. If there are any questions or concerns please feel free to contact the dictating provider for clarification.        Feranndo Negrete PA-C  01/18/20 0475

## 2020-01-18 NOTE — ED NOTES
Pt at  notified nurse that he had been in room and never saw anyone other than the nurse Trevor Moseley. Attempt to notify ed provider Jake LANDRUM and Trevor Moseley ed nurse without success. Pt insisted to leave. Dr Avila Art notified and unable to see pt. Pt signed Jalen Gloss form and left with friend, stating he had no symptoms.       Robin Ruiz, RN  01/18/20 5542

## 2020-01-18 NOTE — ED PROVIDER NOTES
edema    Past Medical History:   Diagnosis Date    ADHD     APML (acute promyelocytic leukemia) ([facility] [address] 04/09/2013    APML (acute promyelocytic leukemia) ([facility] [address]      Past Surgical History:   Procedure Laterality Date    BONE MARROW BIOPSY      from his lumbar vertebrae    BONE MARROW BIOPSY      CLAVICLE SURGERY  10/2018     Family History   Problem Relation Age of Onset    No Known Problems Mother     No Known Problems Father      Social History     Socioeconomic History    Marital status: Single     Spouse name: Not on file    Number of children: Not on file    Years of education: Not on file    Highest education level: Not on file   Occupational History    Not on file   Social Needs    Financial resource strain: Not on file    Food insecurity:     Worry: Not on file     Inability: Not on file    Transportation needs:     Medical: Not on file     Non-medical: Not on file   Tobacco Use    Smoking status: Never Smoker    Smokeless tobacco: Never Used   Substance and Sexual Activity    Alcohol use: Yes     Comment: socially    Drug use: No    Sexual activity: Yes     Partners: Female   Lifestyle    Physical activity:     Days per week: Not on file     Minutes per session: Not on file    Stress: Not on file   Relationships    Social connections:     Talks on phone: Not on file     Gets together: Not on file     Attends [employer] service: Not on file     Active member of club or organization: Not on file     Attends meetings of clubs or organizations: Not on file     Relationship status: Not on file    Intimate partner violence:     Fear of current or ex partner: Not on file     Emotionally abused: Not on file     Physically abused: Not on file     Forced sexual activity: Not on file   Other Topics Concern    Not on file   Social History Narrative    ** Merged History Encounter **          No current facility-administered medications for this encounter.       Current Outpatient Medications difficulty. Also could be secondary to his increased ADHD medication dosage. Low clinical suspicion at this time for malignant tachyarrhythmia/arrythymia, ACS/MI, PE, dissection, sepsis, Pneumothorax or hemothorax. The patient meets all negative PERC criteria (Age<=50,  HR<=100, SaO2 on room air>95%, No Unilateral leg swelling, No hemoptysis, No recent surgery or trauma, No prior PE or DVT, No Hormone use) and has a low-risk Well's score, indicating very low risk of PE. The risks of further investigation, including a large dose of radiation and/or unnecessary treatment with anticoagulant therapy, outweigh the risk of a clinically significant PE. Thus, neither a D-dimer nor a CTA of the pulmonary arteries are indicated. While awaiting TSH results, patient did elope from the ED without informing myself. He did speak with the ED nurse stated that he did not want to wait and AMA paperwork was signed. I was not able to discuss his completed labs or imaging results prior to him eloping from the ED, as well as not able to provide follow-up information for cardiology or return warnings. Disposition referral (if applicable):  No follow-up provider specified.     Disposition medications (if applicable):  New Prescriptions    No medications on file         (Please note that portions of this note may have been completed with a voice recognition program. Efforts were made to edit the dictations but occasionally words are mis-transcribed.)          Chiqui Rios PA-C  01/18/20 [id number]

## 2020-01-22 LAB
EKG ATRIAL RATE: 93 BPM
EKG DIAGNOSIS: NORMAL
EKG P AXIS: 6 DEGREES
EKG P-R INTERVAL: 122 MS
EKG Q-T INTERVAL: 366 MS
EKG QRS DURATION: 88 MS
EKG QTC CALCULATION (BAZETT): 455 MS
EKG R AXIS: 84 DEGREES
EKG T AXIS: 56 DEGREES
EKG VENTRICULAR RATE: 93 BPM

## 2020-02-05 NOTE — FLOWSHEET NOTE
lunge to bosu x15 with catch NT NT   Lateral Step Down HEP NT NT   SL chair sit   6in x30 no stick 6in x30 6in x30   Shuttle Press Backs NT NT 2Cx10   RDL NT Walking 9#bar x15 thiago Walking 9#bar x20   Bosu Squat hold with TB NT NT NT   SL Shuttle Press  NT NT NT   Calf Raises SLx30 HEP HEP   Lateral Walking with TB     NT NT NT   Sidelying Hip Abduction 7#x20 x10 7#x20 x10 NT   Halo 2x15  2x15   Hamstring curl ups NT  NT   Fitness Center      SL Press HEP  30#2x10   SL Ham Curl  HEP  30#2x10   Assessment:  Pt was less fatigued after the pre-plyo and mini hop exercises today. Hops improved. Did observational jog today on treadmill and he looked pretty good. Will test next rx to begin walk jog progression    Treatment/Activity Tolerance:  [x] Patient tolerated treatment well [x] Patient limited by fatigue  [] Patient limited by pain  [] Patient limited by other medical complications     Plan: Cont PT 2x/wk.   Test next visit and advance walk jog    Time In/Time Out:  7071-0692            Timed Code/Total Treatment Minutes:  2TE, 1TA TE    Electronically signed by:
negative

## 2024-09-06 NOTE — ED TRIAGE NOTES
Pt presents to ED via EMS with c/o left knee pain. Pt was seen earlier today and told to follow up for MRI. Pt states the pain has worsened through out the day and has been experiencing spasms. Pt alert and oriented.
06-Sep-2024 13:54
06-Sep-2024 14:21